# Patient Record
Sex: MALE
[De-identification: names, ages, dates, MRNs, and addresses within clinical notes are randomized per-mention and may not be internally consistent; named-entity substitution may affect disease eponyms.]

---

## 2018-04-29 ENCOUNTER — HOSPITAL ENCOUNTER (EMERGENCY)
Dept: HOSPITAL 25 - UCEAST | Age: 53
Discharge: HOME | End: 2018-04-29
Payer: COMMERCIAL

## 2018-04-29 VITALS — DIASTOLIC BLOOD PRESSURE: 79 MMHG | SYSTOLIC BLOOD PRESSURE: 135 MMHG

## 2018-04-29 DIAGNOSIS — L25.9: Primary | ICD-10-CM

## 2018-04-29 DIAGNOSIS — Z85.820: ICD-10-CM

## 2018-04-29 PROCEDURE — G0463 HOSPITAL OUTPT CLINIC VISIT: HCPCS

## 2018-04-29 PROCEDURE — 99212 OFFICE O/P EST SF 10 MIN: CPT

## 2018-04-29 NOTE — UC
Skin Complaint HPI





- HPI Summary


HPI Summary: 





Pt presents with rash to b/l inner thighs first noticed about 2 hours PTA. 

Mildly itchy. No new soaps/detergents/clothes. Denies fever/chills





- History of Current Complaint


Chief Complaint: UCSkin


Time Seen by Provider: 04/29/18 13:32


Stated Complaint: RASH


Hx Obtained From: Patient


Onset/Duration: Sudden Onset


Skin Exposure Onset/Duration: Hours Ago


Current Severity: None


Pain Intensity: 0





- Allergy/Home Medications


Allergies/Adverse Reactions: 


 Allergies











Allergy/AdvReac Type Severity Reaction Status Date / Time


 


No Known Allergies Allergy   Verified 04/29/18 13:31














Review of Systems


Constitutional: Negative


Skin: Rash


Respiratory: Negative


Cardiovascular: Negative


Neurovascular: Negative


Neurological: Negative


Psychological: Negative


All Other Systems Reviewed And Are Negative: Yes





PMH/Surg Hx/FS Hx/Imm Hx





- Additional Past Medical History


Additional PMH: 





None


Previously Healthy: Yes





- Surgical History


Surgical History: Yes


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





- Family History


Known Family History: Positive: None





- Social History


Occupation: Employed Full-time


Lives: With Family


Alcohol Use: Occasionally


Substance Use Type: None


Smoking Status (MU): Never Smoked Tobacco





Physical Exam





- Summary


Physical Exam Summary: 





GENERAL: NAD. WDWN. No pain distress.


SKIN: B/L inner thigh with mildly erythematous with diffuse <1mm papules. Left 

thigh > Right. No hyper/hypopigmentation. No drainage or bleeding


NECK: Supple. Nontender. No lymphadenopathy. 


CHEST:  No accessory muscle use. Breathing comfortably and in no distress.


CV:  RRR. Without m/r/g. 


NEURO: Alert. CN II-XII grossly intact.


PSYCH: Age appropriate behavior.


Triage Information Reviewed: Yes


Vital Signs: 


 Initial Vital Signs











Temp  98.3 F   04/29/18 13:29


 


Pulse  59   04/29/18 13:29


 


Resp  18   04/29/18 13:29


 


BP  135/79   04/29/18 13:29


 


Pulse Ox  98   04/29/18 13:29














Course/Dx





- Course


Course Of Treatment: Suspect contact dermatitis. Rx for steroid cream





- Diagnoses


Provider Diagnoses: Contact dermatitis





Discharge





- Sign-Out/Discharge


Documenting (check all that apply): Discharge/Admit/Transfer





- Discharge Plan


Condition: Stable


Disposition: HOME


Prescriptions: 


Acyclovir [Acyclovir 5% TOPICAL] 5 % TOPICAL Q3H #1 tube


Betamethasone Cee 0.1% CRM(NF) [Valisone 0.1% CM(NF)] 1 applic TOPICAL BID #1 

tube


Patient Education Materials:  Contact Dermatitis (DC)


Referrals: 


Mario Steinberg MD [Primary Care Provider] - 


Additional Instructions: 


If you develop a fever, shortness of breath, chest pain, new or worsening 

symptoms - please call your PCP or go to the ED.


 





Your blood pressure was high at todays visit. Please see your primary provider 

within 4 weeks for recheck and re-evaluation.








- Billing Disposition and Condition


Condition: STABLE


Disposition: HOME